# Patient Record
Sex: FEMALE | Race: WHITE | NOT HISPANIC OR LATINO
[De-identification: names, ages, dates, MRNs, and addresses within clinical notes are randomized per-mention and may not be internally consistent; named-entity substitution may affect disease eponyms.]

---

## 2018-02-12 ENCOUNTER — APPOINTMENT (OUTPATIENT)
Dept: UROLOGY | Facility: CLINIC | Age: 53
End: 2018-02-12
Payer: COMMERCIAL

## 2018-02-12 VITALS
WEIGHT: 200 LBS | BODY MASS INDEX: 33.32 KG/M2 | SYSTOLIC BLOOD PRESSURE: 156 MMHG | HEART RATE: 83 BPM | DIASTOLIC BLOOD PRESSURE: 83 MMHG | RESPIRATION RATE: 17 BRPM | HEIGHT: 65 IN

## 2018-02-12 DIAGNOSIS — R35.0 FREQUENCY OF MICTURITION: ICD-10-CM

## 2018-02-12 DIAGNOSIS — R82.90 UNSPECIFIED ABNORMAL FINDINGS IN URINE: ICD-10-CM

## 2018-02-12 PROCEDURE — 99204 OFFICE O/P NEW MOD 45 MIN: CPT

## 2018-02-14 LAB
APPEARANCE: CLEAR
BACTERIA UR CULT: NORMAL
BACTERIA: NEGATIVE
BILIRUBIN URINE: NEGATIVE
BLOOD URINE: ABNORMAL
COLOR: YELLOW
GLUCOSE QUALITATIVE U: NEGATIVE MG/DL
HYALINE CASTS: 1 /LPF
KETONES URINE: NEGATIVE
LEUKOCYTE ESTERASE URINE: NEGATIVE
MICROSCOPIC-UA: NORMAL
NITRITE URINE: NEGATIVE
PH URINE: 5.5
PROTEIN URINE: NEGATIVE MG/DL
RED BLOOD CELLS URINE: 7 /HPF
SPECIFIC GRAVITY URINE: 1.02
SQUAMOUS EPITHELIAL CELLS: 1 /HPF
UROBILINOGEN URINE: NEGATIVE MG/DL
WHITE BLOOD CELLS URINE: 1 /HPF

## 2018-02-19 ENCOUNTER — FORM ENCOUNTER (OUTPATIENT)
Age: 53
End: 2018-02-19

## 2018-02-20 ENCOUNTER — APPOINTMENT (OUTPATIENT)
Dept: CT IMAGING | Facility: IMAGING CENTER | Age: 53
End: 2018-02-20
Payer: COMMERCIAL

## 2018-02-20 ENCOUNTER — OUTPATIENT (OUTPATIENT)
Dept: OUTPATIENT SERVICES | Facility: HOSPITAL | Age: 53
LOS: 1 days | End: 2018-02-20
Payer: COMMERCIAL

## 2018-02-20 DIAGNOSIS — R31.29 OTHER MICROSCOPIC HEMATURIA: ICD-10-CM

## 2018-02-20 PROCEDURE — 82565 ASSAY OF CREATININE: CPT

## 2018-02-20 PROCEDURE — 74178 CT ABD&PLV WO CNTR FLWD CNTR: CPT | Mod: 26

## 2018-02-20 PROCEDURE — 74178 CT ABD&PLV WO CNTR FLWD CNTR: CPT

## 2018-02-21 ENCOUNTER — APPOINTMENT (OUTPATIENT)
Dept: UROLOGY | Facility: CLINIC | Age: 53
End: 2018-02-21
Payer: COMMERCIAL

## 2018-02-21 ENCOUNTER — OUTPATIENT (OUTPATIENT)
Dept: OUTPATIENT SERVICES | Facility: HOSPITAL | Age: 53
LOS: 1 days | End: 2018-02-21
Payer: COMMERCIAL

## 2018-02-21 VITALS
HEART RATE: 71 BPM | TEMPERATURE: 97.7 F | HEIGHT: 65 IN | WEIGHT: 210 LBS | DIASTOLIC BLOOD PRESSURE: 77 MMHG | RESPIRATION RATE: 16 BRPM | SYSTOLIC BLOOD PRESSURE: 118 MMHG | BODY MASS INDEX: 34.99 KG/M2

## 2018-02-21 DIAGNOSIS — R35.0 FREQUENCY OF MICTURITION: ICD-10-CM

## 2018-02-21 DIAGNOSIS — R31.29 OTHER MICROSCOPIC HEMATURIA: ICD-10-CM

## 2018-02-21 PROCEDURE — 52000 CYSTOURETHROSCOPY: CPT

## 2018-02-27 DIAGNOSIS — R31.29 OTHER MICROSCOPIC HEMATURIA: ICD-10-CM

## 2018-02-28 ENCOUNTER — APPOINTMENT (OUTPATIENT)
Dept: ULTRASOUND IMAGING | Facility: IMAGING CENTER | Age: 53
End: 2018-02-28

## 2018-03-01 ENCOUNTER — APPOINTMENT (OUTPATIENT)
Dept: ULTRASOUND IMAGING | Facility: CLINIC | Age: 53
End: 2018-03-01
Payer: COMMERCIAL

## 2018-03-01 ENCOUNTER — OUTPATIENT (OUTPATIENT)
Dept: OUTPATIENT SERVICES | Facility: HOSPITAL | Age: 53
LOS: 1 days | End: 2018-03-01
Payer: COMMERCIAL

## 2018-03-01 DIAGNOSIS — Z00.8 ENCOUNTER FOR OTHER GENERAL EXAMINATION: ICD-10-CM

## 2018-03-01 PROCEDURE — 76705 ECHO EXAM OF ABDOMEN: CPT

## 2018-03-01 PROCEDURE — 76705 ECHO EXAM OF ABDOMEN: CPT | Mod: 26

## 2018-03-15 ENCOUNTER — APPOINTMENT (OUTPATIENT)
Dept: NUCLEAR MEDICINE | Facility: HOSPITAL | Age: 53
End: 2018-03-15
Payer: COMMERCIAL

## 2018-03-15 ENCOUNTER — OUTPATIENT (OUTPATIENT)
Dept: OUTPATIENT SERVICES | Facility: HOSPITAL | Age: 53
LOS: 1 days | End: 2018-03-15
Payer: COMMERCIAL

## 2018-03-15 DIAGNOSIS — R10.83 COLIC: ICD-10-CM

## 2018-03-15 DIAGNOSIS — Z00.00 ENCOUNTER FOR GENERAL ADULT MEDICAL EXAMINATION WITHOUT ABNORMAL FINDINGS: ICD-10-CM

## 2018-03-15 PROCEDURE — 78227 HEPATOBIL SYST IMAGE W/DRUG: CPT | Mod: 26

## 2018-03-15 PROCEDURE — 78227 HEPATOBIL SYST IMAGE W/DRUG: CPT

## 2018-03-15 PROCEDURE — A9537: CPT

## 2018-04-09 ENCOUNTER — APPOINTMENT (OUTPATIENT)
Dept: UROGYNECOLOGY | Facility: CLINIC | Age: 53
End: 2018-04-09
Payer: COMMERCIAL

## 2018-04-09 VITALS
BODY MASS INDEX: 34.99 KG/M2 | HEIGHT: 65 IN | WEIGHT: 210 LBS | HEART RATE: 68 BPM | SYSTOLIC BLOOD PRESSURE: 128 MMHG | DIASTOLIC BLOOD PRESSURE: 80 MMHG

## 2018-04-09 DIAGNOSIS — Z80.6 FAMILY HISTORY OF LEUKEMIA: ICD-10-CM

## 2018-04-09 DIAGNOSIS — Z83.49 FAMILY HISTORY OF OTHER ENDOCRINE, NUTRITIONAL AND METABOLIC DISEASES: ICD-10-CM

## 2018-04-09 DIAGNOSIS — Z82.49 FAMILY HISTORY OF ISCHEMIC HEART DISEASE AND OTHER DISEASES OF THE CIRCULATORY SYSTEM: ICD-10-CM

## 2018-04-09 DIAGNOSIS — Z87.09 PERSONAL HISTORY OF OTHER DISEASES OF THE RESPIRATORY SYSTEM: ICD-10-CM

## 2018-04-09 DIAGNOSIS — Z87.01 PERSONAL HISTORY OF PNEUMONIA (RECURRENT): ICD-10-CM

## 2018-04-09 DIAGNOSIS — N81.6 RECTOCELE: ICD-10-CM

## 2018-04-09 PROCEDURE — 81003 URINALYSIS AUTO W/O SCOPE: CPT | Mod: QW

## 2018-04-09 PROCEDURE — 99204 OFFICE O/P NEW MOD 45 MIN: CPT | Mod: 25

## 2018-04-09 RX ORDER — LOSARTAN POTASSIUM 100 MG/1
TABLET, FILM COATED ORAL
Refills: 0 | Status: ACTIVE | COMMUNITY

## 2018-04-10 LAB
APPEARANCE: CLEAR
BACTERIA: NEGATIVE
BILIRUB UR QL STRIP: NORMAL
BILIRUBIN URINE: NEGATIVE
BLOOD URINE: NEGATIVE
CLARITY UR: CLEAR
COLLECTION METHOD: NORMAL
COLOR: YELLOW
GLUCOSE QUALITATIVE U: NEGATIVE MG/DL
GLUCOSE UR-MCNC: NORMAL
HCG UR QL: 0.2 EU/DL
HGB UR QL STRIP.AUTO: NORMAL
KETONES UR-MCNC: NORMAL
KETONES URINE: NEGATIVE
LEUKOCYTE ESTERASE UR QL STRIP: NORMAL
LEUKOCYTE ESTERASE URINE: NEGATIVE
MICROSCOPIC-UA: NORMAL
NITRITE UR QL STRIP: NORMAL
NITRITE URINE: NEGATIVE
PH UR STRIP: 7
PH URINE: 7
PROT UR STRIP-MCNC: NORMAL
PROTEIN URINE: NEGATIVE MG/DL
RED BLOOD CELLS URINE: 1 /HPF
SP GR UR STRIP: 1.01
SPECIFIC GRAVITY URINE: 1.01
SQUAMOUS EPITHELIAL CELLS: 0 /HPF
UROBILINOGEN URINE: NEGATIVE MG/DL
WHITE BLOOD CELLS URINE: 0 /HPF

## 2018-04-11 LAB — BACTERIA UR CULT: NORMAL

## 2018-04-19 ENCOUNTER — OUTPATIENT (OUTPATIENT)
Dept: OUTPATIENT SERVICES | Facility: HOSPITAL | Age: 53
LOS: 1 days | End: 2018-04-19
Payer: COMMERCIAL

## 2018-04-19 ENCOUNTER — APPOINTMENT (OUTPATIENT)
Dept: MAMMOGRAPHY | Facility: IMAGING CENTER | Age: 53
End: 2018-04-19
Payer: COMMERCIAL

## 2018-04-19 DIAGNOSIS — Z12.31 ENCOUNTER FOR SCREENING MAMMOGRAM FOR MALIGNANT NEOPLASM OF BREAST: ICD-10-CM

## 2018-04-19 PROCEDURE — 77067 SCR MAMMO BI INCL CAD: CPT | Mod: 26

## 2018-04-19 PROCEDURE — 77063 BREAST TOMOSYNTHESIS BI: CPT

## 2018-04-19 PROCEDURE — 77067 SCR MAMMO BI INCL CAD: CPT

## 2018-04-19 PROCEDURE — 77063 BREAST TOMOSYNTHESIS BI: CPT | Mod: 26

## 2018-04-24 ENCOUNTER — OUTPATIENT (OUTPATIENT)
Dept: OUTPATIENT SERVICES | Facility: HOSPITAL | Age: 53
LOS: 1 days | End: 2018-04-24
Payer: COMMERCIAL

## 2018-04-24 ENCOUNTER — APPOINTMENT (OUTPATIENT)
Dept: ULTRASOUND IMAGING | Facility: IMAGING CENTER | Age: 53
End: 2018-04-24
Payer: COMMERCIAL

## 2018-04-24 DIAGNOSIS — R92.2 INCONCLUSIVE MAMMOGRAM: ICD-10-CM

## 2018-04-24 PROCEDURE — 76642 ULTRASOUND BREAST LIMITED: CPT | Mod: 26,LT

## 2018-04-24 PROCEDURE — 76642 ULTRASOUND BREAST LIMITED: CPT

## 2018-04-26 ENCOUNTER — APPOINTMENT (OUTPATIENT)
Dept: UROGYNECOLOGY | Facility: CLINIC | Age: 53
End: 2018-04-26
Payer: COMMERCIAL

## 2018-04-26 ENCOUNTER — OUTPATIENT (OUTPATIENT)
Dept: OUTPATIENT SERVICES | Facility: HOSPITAL | Age: 53
LOS: 1 days | End: 2018-04-26
Payer: COMMERCIAL

## 2018-04-26 DIAGNOSIS — N39.3 STRESS INCONTINENCE (FEMALE) (MALE): ICD-10-CM

## 2018-04-26 DIAGNOSIS — N39.46 MIXED INCONTINENCE: ICD-10-CM

## 2018-04-26 DIAGNOSIS — Z01.818 ENCOUNTER FOR OTHER PREPROCEDURAL EXAMINATION: ICD-10-CM

## 2018-04-26 PROCEDURE — 51784 ANAL/URINARY MUSCLE STUDY: CPT

## 2018-04-26 PROCEDURE — 51797 INTRAABDOMINAL PRESSURE TEST: CPT | Mod: 26

## 2018-04-26 PROCEDURE — 51729 CYSTOMETROGRAM W/VP&UP: CPT | Mod: 26

## 2018-04-26 PROCEDURE — 51784 ANAL/URINARY MUSCLE STUDY: CPT | Mod: 26

## 2018-04-26 PROCEDURE — 51729 CYSTOMETROGRAM W/VP&UP: CPT

## 2018-04-26 PROCEDURE — 51797 INTRAABDOMINAL PRESSURE TEST: CPT

## 2018-04-27 DIAGNOSIS — N39.3 STRESS INCONTINENCE (FEMALE) (MALE): ICD-10-CM

## 2018-04-27 DIAGNOSIS — R33.9 RETENTION OF URINE, UNSPECIFIED: ICD-10-CM

## 2018-04-27 DIAGNOSIS — N39.46 MIXED INCONTINENCE: ICD-10-CM

## 2018-05-07 ENCOUNTER — APPOINTMENT (OUTPATIENT)
Dept: UROGYNECOLOGY | Facility: CLINIC | Age: 53
End: 2018-05-07
Payer: COMMERCIAL

## 2018-05-07 DIAGNOSIS — R33.9 RETENTION OF URINE, UNSPECIFIED: ICD-10-CM

## 2018-05-07 PROCEDURE — 99213 OFFICE O/P EST LOW 20 MIN: CPT

## 2018-07-25 PROBLEM — N39.3 STRESS INCONTINENCE: Status: ACTIVE | Noted: 2018-04-26

## 2018-08-06 ENCOUNTER — APPOINTMENT (OUTPATIENT)
Dept: UROGYNECOLOGY | Facility: CLINIC | Age: 53
End: 2018-08-06

## 2019-03-28 ENCOUNTER — APPOINTMENT (OUTPATIENT)
Dept: VASCULAR SURGERY | Facility: CLINIC | Age: 54
End: 2019-03-28
Payer: COMMERCIAL

## 2019-03-28 VITALS
SYSTOLIC BLOOD PRESSURE: 143 MMHG | BODY MASS INDEX: 36.65 KG/M2 | HEART RATE: 73 BPM | TEMPERATURE: 98 F | HEIGHT: 65 IN | DIASTOLIC BLOOD PRESSURE: 82 MMHG | WEIGHT: 220 LBS

## 2019-03-28 VITALS — HEART RATE: 69 BPM | DIASTOLIC BLOOD PRESSURE: 83 MMHG | SYSTOLIC BLOOD PRESSURE: 143 MMHG

## 2019-03-28 DIAGNOSIS — R60.0 LOCALIZED EDEMA: ICD-10-CM

## 2019-03-28 DIAGNOSIS — R25.2 CRAMP AND SPASM: ICD-10-CM

## 2019-03-28 DIAGNOSIS — Z82.49 FAMILY HISTORY OF ISCHEMIC HEART DISEASE AND OTHER DISEASES OF THE CIRCULATORY SYSTEM: ICD-10-CM

## 2019-03-28 PROCEDURE — 93970 EXTREMITY STUDY: CPT

## 2019-03-28 PROCEDURE — 99203 OFFICE O/P NEW LOW 30 MIN: CPT

## 2019-03-28 NOTE — PHYSICAL EXAM
[2+] : left 2+ [Ankle Swelling (On Exam)] : present [Varicose Veins Of Lower Extremities] : bilaterally [Ankle Swelling Bilaterally] : severe [] : bilaterally [Ankle Swelling On The Left] : moderate [No Rash or Lesion] : No rash or lesion [Alert] : alert [Oriented to Person] : oriented to person [Oriented to Place] : oriented to place [Oriented to Time] : oriented to time [Calm] : calm [JVD] : no jugular venous distention  [Skin Ulcer] : no ulcer [de-identified] : well appearing overweight female in NAD [de-identified] : NC / AT [de-identified] : non-labored respirations [de-identified] : reg rate [de-identified] : LE vein findings: \par Varicosities (spider, reticular, varicose) bilateral \par Edema bilateral \par Skin changes  dry skin, stasis dermatitis, hyperpigmentation in the gator area, lipodermatosclerosis, hyperkeratosis (skin thickening), eschar of the varix along the left medial malleolar area from previous bleed\par Ulcer none\par CEAP classification C4b\par Palpable DP pulses bilaterally\par \par  [de-identified] : pleasant and cooperative

## 2019-03-28 NOTE — DATA REVIEWED
[FreeTextEntry1] : Lower extremity venous duplex done today demonstrates bilateral venous insufficiency of the tributary veins. No evidence of DVT.\par

## 2019-03-28 NOTE — ASSESSMENT
[FreeTextEntry1] : Ms. SHELLY SMITH is a 53 year with persistent and worsening bilateral  lower extremity painful varicosities, recurrent bleeding varix (last year - RLE and recently LLE), CEAP classification C4b which is unresponsive to at least 3 months of compression stockings 20-30 mmHg, leg elevation, exercise and over the counter pain medication (Alleve).  Patient has complaints of worsening leg discomfort with swelling, fatigue, heaviness, achiness, cramping, restlessness, and painful varicosities.  The patient’s symptoms interfere with their ADL’s, such as walking, shopping and house work, and their ability to work. Patient has intact pulses in both legs without evidence of arterial insufficiency.  \par \par Treatment is indicated not for cosmetic reasons but for symptomatic venous reflux disease with symptoms such as bleeding varices which is refractory to conservative therapy. Venous duplex study demonstrates bilateral lower extremity venous insufficiency in the tributary veins. The need for definitive effective treatment is based on severe, interfering and worsening symptoms with evidence of venous insufficiency on venous ultrasound. \par \par Patient is a candidate for bilateral stab phlebectomy with ultrasound guided foam sclerotherapy.\par The risks and benefits of treatment versus continued conservative management were discussed with the patient.  Patient chooses phlebectomy with UGFS treatments. Treatment plan to be scheduled. \par \par

## 2019-03-28 NOTE — HISTORY OF PRESENT ILLNESS
[FreeTextEntry1] : Ms. SHELLY SMITH is a 53 year who presents for initial evaluation of bleeding left ankle varix a few days ago that every time she removes the bandaid rebleeds and she has to apply a compression bandage to control the bleeding. Patient has had bilateral leg pain, varicose veins and leg swelling for the past several years. She was worked up for venous insufficiency 6 years ago, however,  at the time her symptoms were not as severe. She had an episode of bleeding varix on her right leg last year but the bleeding stopped after direct pressure. \par Patient's leg discomfort is associated with leg swelling, fatigue, heaviness, achiness, restlessness, muscle cramping, itchiness, dry, flaky skin, and skin darkening at the ankles. \par Patient complains of painful varicose veins especially while working.\par Patient's symptoms have persisted despite conservative management with leg elevation, exercise, attempted weight loss, over-the-counter medications daily (Alleve), and compression stocking use for more than 3 months. \par Patient's symptoms are aggravated by weight bearing, prolonged standing, prolonged sitting, extended travel, exercise and previous pregnancy x 4.\par Patient's symptoms are alleviated by leg elevation and wearing compression stockings, however, she has been unable to tolerate them over the last year because her varicose veins have become more painful when wearing them.\par Patient's symptoms interfere with the patient's ADLs such as work, shopping and housekeeping.  \par \par Patient's risks factor for venous disease are obesity, pregnancy, family history of venous disease (mother - DVT, varicose veins s/p stripping, sister - varicose veins s/p ablations), history of varicose veins, and spider veins. \par Patient works as a caretaker for the elderly and stands for prolonged periods of time with the inability to take frequent breaks to elevate their legs. \par \par Patient has had no previous vein treatments. \par \par \par

## 2019-03-28 NOTE — VITALS
[Dull] : dull [Aching] : aching [Gnawing] : gnawing [Continuous] : continuous [FreeTextEntry1] : rest with leg elevation [FreeTextEntry2] : prolonged standing

## 2019-03-28 NOTE — REVIEW OF SYSTEMS
[Lower Ext Edema] : lower extremity edema [Limb Pain] : limb pain [Limb Swelling] : limb swelling [Fever] : no fever [Chills] : no chills [Eyesight Problems] : no eyesight problems [Loss Of Hearing] : no hearing loss [Chest Pain] : no chest pain [Palpitations] : no palpitations [Difficulty Walking] : no difficulty walking [Easy Bleeding] : no tendency for easy bleeding [Easy Bruising] : no tendency for easy bruising

## 2019-04-08 ENCOUNTER — OTHER (OUTPATIENT)
Age: 54
End: 2019-04-08

## 2019-04-08 RX ORDER — ALPRAZOLAM 0.5 MG/1
0.5 TABLET ORAL
Qty: 2 | Refills: 0 | Status: COMPLETED | COMMUNITY
Start: 2019-04-08 | End: 2019-04-09

## 2019-04-12 ENCOUNTER — APPOINTMENT (OUTPATIENT)
Dept: VASCULAR SURGERY | Facility: CLINIC | Age: 54
End: 2019-04-12
Payer: COMMERCIAL

## 2019-04-12 PROCEDURE — 36471 NJX SCLRSNT MLT INCMPTNT VN: CPT | Mod: LT

## 2019-04-12 PROCEDURE — 76942 ECHO GUIDE FOR BIOPSY: CPT | Mod: LT

## 2019-04-12 PROCEDURE — 37766 PHLEB VEINS - EXTREM 20+: CPT | Mod: LT

## 2019-04-12 NOTE — PROCEDURE
[FreeTextEntry1] : left stab phlebectomy with ultrasound guided foam sclerotherapy [FreeTextEntry3] : Procedural safety checklist and time out completed:\par Confirmed patient identification (Patient Name, , and/or medical record number including when possible affirmation by patient or parent/family/other.\par Confirmed procedure with the patient. Consent present, accurate and signed. \par Confirmed special equipment and supplies are present.\par Sterility confirmed. Position verified. \par Site/ side is marked and visible and confirmed. \par Procedure confirmed by consent. Accurate consent including side and site.\par Review of medical records noting correct procedure including site and side.\par MD/PA verifies presence and review of imaging studies and or written report of imaging studies.\par Specify equipment are available for the planned procedure.\par MD/PA has marked the patient's procedural site and side.\par Agreement on the procedure to be performed\par Time out completed.\par All of the above has been confirmed by the team.\par All patient-specific concerns have been addressed. \par \par Indication:  Left lower extremity varicose veins with inflammation, leg pain, leg swelling, and leg cramping.  \par \par Procedure: Stab phlebectomy left lower extremity\par \par  Ms. SHELLY SMITH is a 53 year old F with a history of symptomatic left lower extremity varicose veins. A trial of compression stockings, exercise, elevation, and pain medication was attempted without relief and definitive treatment with microphlebectomy was offered. \par \par I have discussed the risks of the procedure at length with the patient. The risks discussed were inclusive of but not limited to infection, irritation at the site of infiltration of local anesthesia, and also rare risk of deep venous thrombosis and pulmonary emboli. The patient agrees to proceed with the procedure. \par The patient was escorted into the procedure room, the varicose veins for treatment were marked out and the patient placed on the examination table. The entire limb was prepped and draped in sterile fashion and a  time out was called. \par \par Local anesthesia using 40 cc 1% lidocaine was infiltrated using a 25 gauge needle over the previously marked prominent varicose vein sites.\par Multiple small stab incisions, each less than 1 cm in length was made at the noted sites. With the help of a vein hook, the vein was fished out at each of these sites, rolled over a narrow-tipped mosquito clamp and removed. Hemostasis was secured with leg elevation and application of manual pressure. After assuring hemostasis, a sterile 4x4 was placed on the access sites and an ACE compression wrap was applied. Estimated blood loss: minimal. Patient tolerated procedure well. Patient was given post-procedure instructions and follow up appointment was scheduled. \par \par SIDE - LEFT	\par SITE - CALF / THIGH\par LOCATION - PROXIMAL / DISTAL / MEDIAL / LATERAL / ANTERIOR \par \par Total Stab Incisions 31\par \par Procedure: Left lower extremity ultrasound guided foam sclerotherapy. \par \par The patient has come for sclerotherapy of the left lower extremity. Location: left distal medial calf.\par I have discussed the risks of the procedure with the patient. Detailed discussion was held with the patient. Risks of itching, superficial thrombophlebitis, hyperpigmentation (darkening of the skin), allergic reactions, skin irritation due to extravasation of the sclerosant, air-embolism, and in rare cases deep vein thrombosis were all discussed with the patient. The patient agrees to the procedure. The patient was escorted into the procedure room, placed on the procedure table and a time out was called. \par \par 1.0 ml of 1.0% Sodium Tetra-decyl sulphate was mixed with 4 ml air. The vein was cannulated with a 27G butterfly needle. The foam solution injected and appropriate visualization of the foam going into the vein was achieved using direct ultrasound guidance. This was repeated at 2 different sites until the entire 3 ml of the foam solution was injected. Every injected area was immediately compressed with gauze. \par Repeat ultrasound of the treated vein was performed confirming successful treatment. After assuring hemostasis, a sterile 4x4 was placed on the access site and an ACE compression wrap was applied. \par Estimated Blood Loss: minimal\par Patient was given post-procedure instructions and follow up appointment with ultrasound was scheduled.\par Medication: STS 1% lot # [id number], exp 2020\par \par

## 2019-04-15 ENCOUNTER — APPOINTMENT (OUTPATIENT)
Dept: VASCULAR SURGERY | Facility: CLINIC | Age: 54
End: 2019-04-15
Payer: COMMERCIAL

## 2019-04-15 PROCEDURE — 93971 EXTREMITY STUDY: CPT

## 2019-04-26 ENCOUNTER — APPOINTMENT (OUTPATIENT)
Dept: VASCULAR SURGERY | Facility: CLINIC | Age: 54
End: 2019-04-26

## 2019-04-29 ENCOUNTER — APPOINTMENT (OUTPATIENT)
Dept: VASCULAR SURGERY | Facility: CLINIC | Age: 54
End: 2019-04-29

## 2019-05-03 ENCOUNTER — APPOINTMENT (OUTPATIENT)
Dept: VASCULAR SURGERY | Facility: CLINIC | Age: 54
End: 2019-05-03
Payer: COMMERCIAL

## 2019-05-03 PROCEDURE — 37766 PHLEB VEINS - EXTREM 20+: CPT | Mod: RT,79

## 2019-05-03 NOTE — PROCEDURE
[FreeTextEntry1] : right stab phlebectomy with ultrasound guided foam of the right tributary veins [FreeTextEntry3] : Procedural safety checklist and time out completed:\par Confirmed patient identification (Patient Name, , and/or medical record number including when possible affirmation by patient or parent/family/other.\par Confirmed procedure with the patient. Consent present, accurate and signed. \par Confirmed special equipment and supplies are present.\par Sterility confirmed. Position verified. \par Site/ side is marked and visible and confirmed. \par Procedure confirmed by consent. Accurate consent including side and site.\par Review of medical records noting correct procedure including site and side.\par MD/PA verifies presence and review of imaging studies and or written report of imaging studies.\par Specify equipment are available for the planned procedure.\par MD/PA has marked the patient's procedural site and side.\par Agreement on the procedure to be performed\par Time out completed.\par All of the above has been confirmed by the team.\par All patient-specific concerns have been addressed. \par \par Indication:  Right lower extremity varicose veins with inflammation, leg pain, leg swelling, and leg cramping.  \par \par Procedure: right stab phlebectomy with ultrasound guided foam of the right tributary veins\par \par  Ms. SHELLY SMITH is a 53 year old F with a history of symptomatic right lower extremity varicose veins. A trial of compression stockings, exercise, elevation, and pain medication was attempted without relief and definitive treatment with microphlebectomy was offered. \par \par I have discussed the risks of the procedure at length with the patient. The risks discussed were inclusive of but not limited to infection, irritation at the site of infiltration of local anesthesia, and also rare risk of deep venous thrombosis and pulmonary emboli. The patient agrees to proceed with the procedure. \par The patient was escorted into the procedure room, the varicose veins for treatment were marked out and the patient placed on the examination table. The entire limb was prepped and draped in sterile fashion and a  time out was called. \par \par Local anesthesia using 40 cc 1% lidocaine was infiltrated using a 25 gauge needle over the previously marked prominent varicose vein sites.\par Multiple small stab incisions, each less than 1 cm in length was made at the noted sites. With the help of a vein hook, the vein was fished out at each of these sites, rolled over a narrow-tipped mosquito clamp and removed. Hemostasis was secured with leg elevation and application of manual pressure. After assuring hemostasis, a sterile 4x4 was placed on the access sites and an ACE compression wrap was applied. Estimated blood loss: minimal. Patient tolerated procedure well. Patient was given post-procedure instructions and follow up appointment was scheduled. \par \par SIDE - RIGHT\par SITE - CALF / THIGH\par LOCATION - PROXIMAL / DISTAL / MEDIAL / LATERAL / ANTERIOR \par \par Total Stab Incisions 32\par \par The patient has come for sclerotherapy of the right lower extremity. Location: right medial ankle\par  \par I have discussed the risks of the procedure with the patient. Detailed discussion was held with the patient. Risks of itching, superficial thrombophlebitis, hyperpigmentation (darkening of the skin), allergic reactions, skin irritation due to extravasation of the sclerosant, air-embolism, and in rare cases deep vein thrombosis were all discussed with the patient. The patient agrees to the procedure. The patient was escorted into the procedure room, placed on the procedure table and a time out was called. \par \par 1.0 ml of 1.0% Sodium Tetra-decyl sulphate was mixed with 4 ml air. The vein was cannulated with a 27G butterfly needle. The foam solution injected and appropriate visualization of the foam going into the vein was achieved using direct ultrasound guidance. This was repeated at 2 different sites until the entire 4 ml of the foam solution was injected. Every injected area was immediately compressed with gauze. \par Repeat ultrasound of the treated vein was performed confirming successful treatment. After assuring hemostasis, a sterile 4x4 was placed on the access site and an ACE compression wrap was applied. \par Estimated Blood Loss: minimal\par Patient was given post-procedure instructions and follow up appointment with ultrasound was scheduled.\par Medication: STS 1% lot # [id number], exp 2020\par \par

## 2019-05-06 ENCOUNTER — APPOINTMENT (OUTPATIENT)
Dept: VASCULAR SURGERY | Facility: CLINIC | Age: 54
End: 2019-05-06
Payer: COMMERCIAL

## 2019-05-06 PROCEDURE — 93971 EXTREMITY STUDY: CPT

## 2019-05-24 ENCOUNTER — APPOINTMENT (OUTPATIENT)
Dept: VASCULAR SURGERY | Facility: CLINIC | Age: 54
End: 2019-05-24

## 2019-06-17 ENCOUNTER — APPOINTMENT (OUTPATIENT)
Dept: VASCULAR SURGERY | Facility: CLINIC | Age: 54
End: 2019-06-17

## 2020-01-09 ENCOUNTER — APPOINTMENT (OUTPATIENT)
Dept: VASCULAR SURGERY | Facility: CLINIC | Age: 55
End: 2020-01-09
Payer: COMMERCIAL

## 2020-01-09 VITALS
HEIGHT: 65 IN | SYSTOLIC BLOOD PRESSURE: 140 MMHG | TEMPERATURE: 97.8 F | WEIGHT: 220 LBS | BODY MASS INDEX: 36.65 KG/M2 | HEART RATE: 62 BPM | DIASTOLIC BLOOD PRESSURE: 80 MMHG

## 2020-01-09 PROCEDURE — 99212 OFFICE O/P EST SF 10 MIN: CPT

## 2020-01-15 NOTE — PHYSICAL EXAM
[No Rash or Lesion] : No rash or lesion [2+] : left 2+ [Skin Ulcer] : no ulcer [Alert] : alert [Oriented to Person] : oriented to person [Oriented to Place] : oriented to place [Oriented to Time] : oriented to time [Calm] : calm [FreeTextEntry1] : Legs warm and well perfused\par VV bilateral LE \par superficial varicosities below R knee [de-identified] : NAD [de-identified] : unlabored

## 2020-01-15 NOTE — ASSESSMENT
[FreeTextEntry1] : SHELLY SMITH is a 54 year female who presents today for follow up s/p bilateral stab phlebectomy and UGFS (L 4/2019, R 5/2019). Post-procedure ultrasounds were negative for DVT. She presents today for follow up evaluation. She is concerned about large veins present on both legs but especially present just below the right knee. She has a history of bleeding varices and is concerned about another incident. \par \par On exam, legs are warm and well perfused. Scattered varicosities present in bilateral lower extremities. Prominent and superficial varices noted below the right knee. Skin is intact.\par \par Recommended repeating VLE. Patient would like to return another day for the test. Will schedule VLE for a different day and discuss result and plan over the phone.

## 2020-01-15 NOTE — REVIEW OF SYSTEMS
[Chest Pain] : no chest pain [Leg Claudication] : no intermittent leg claudication [Abdominal Pain] : no abdominal pain [Shortness Of Breath] : no shortness of breath [Limb Swelling] : no limb swelling [Limb Pain] : no limb pain [Limb Weakness] : no limb weakness [Negative] : Constitutional [Difficulty Walking] : no difficulty walking [FreeTextEntry5] : bilateral LE VV

## 2020-01-15 NOTE — HISTORY OF PRESENT ILLNESS
[FreeTextEntry1] : SHELLY SMITH is a 54 year female who presents today for follow up s/p bilateral stab phlebectomy and UGFS (L 4/2019, R 5/2019). Post-procedure ultrasounds were negative for DVT. She presents today for follow up evaluation. She is concerned about large veins present on both legs but especially present just below the right knee. She has a history of bleeding varices and is concerned about another incident.

## 2021-04-22 ENCOUNTER — APPOINTMENT (OUTPATIENT)
Dept: VASCULAR SURGERY | Facility: CLINIC | Age: 56
End: 2021-04-22
Payer: COMMERCIAL

## 2021-04-22 VITALS
DIASTOLIC BLOOD PRESSURE: 84 MMHG | SYSTOLIC BLOOD PRESSURE: 156 MMHG | BODY MASS INDEX: 36.65 KG/M2 | WEIGHT: 220 LBS | TEMPERATURE: 97.6 F | HEIGHT: 65 IN | HEART RATE: 67 BPM

## 2021-04-22 VITALS — SYSTOLIC BLOOD PRESSURE: 147 MMHG | HEART RATE: 63 BPM | DIASTOLIC BLOOD PRESSURE: 82 MMHG

## 2021-04-22 DIAGNOSIS — I83.893 VARICOSE VEINS OF BILATERAL LOWER EXTREMITIES WITH OTHER COMPLICATIONS: ICD-10-CM

## 2021-04-22 PROCEDURE — 93970 EXTREMITY STUDY: CPT

## 2021-04-22 PROCEDURE — 99072 ADDL SUPL MATRL&STAF TM PHE: CPT

## 2021-04-24 PROBLEM — I83.893 VARICOSE VEINS OF LOWER EXTREMITIES WITH COMPLICATIONS, BILATERAL: Status: ACTIVE | Noted: 2019-03-28

## 2021-04-24 NOTE — ASSESSMENT
[FreeTextEntry1] : A/P: Patient with h/o venous insufficiency and s/p multiple vein procedures now with bleeding varix of the R anterior ankle crease. Recommend UGFS of the vein to prevent future bleeding episodes. Discussed procedure with the patient as well as r/b/a. Patient understands and wishes to proceed.

## 2021-04-24 NOTE — PHYSICAL EXAM
[2+] : left 2+ [No Rash or Lesion] : No rash or lesion [Skin Ulcer] : no ulcer [Alert] : alert [Oriented to Person] : oriented to person [Oriented to Place] : oriented to place [Oriented to Time] : oriented to time [Calm] : calm [de-identified] : NAD [FreeTextEntry1] : Legs warm and well perfused\par VV, edema, hyperpigmentation, dry skin, stasis dermatitis bilateral LE \par healed anterior R ankle varix [de-identified] : unlabored

## 2021-04-24 NOTE — HISTORY OF PRESENT ILLNESS
[FreeTextEntry1] : SHELLY SMITH is a 55 year old female who presents today for a bleeding varix. Reports that last week, she was shaving her legs and noticed bleeding from a varix on the R ankle crease. She applied compression and the bleeding stopped after several minutes. She reports that the area appears to be healing but she is concerned about another bleeding incident. She also c/o bilateral LE edema, dry skin, hyperpigmentation, and varicose veins. She has been wearing CS for several years. She is s/p bilateral stab phlebectomy and UGFS (L 4/2019, R 5/2019).\par \par VLE from today is negative for DVT bilaterally. Bilateral GSV and L SSV reflux in small caliber veins.

## 2021-04-24 NOTE — REVIEW OF SYSTEMS
[Leg Claudication] : no intermittent leg claudication [As Noted in HPI] : as noted in HPI [Negative] : Constitutional

## 2021-05-17 ENCOUNTER — APPOINTMENT (OUTPATIENT)
Dept: VASCULAR SURGERY | Facility: CLINIC | Age: 56
End: 2021-05-17

## 2021-05-17 DIAGNOSIS — Z01.812 ENCOUNTER FOR PREPROCEDURAL LABORATORY EXAMINATION: ICD-10-CM

## 2021-05-17 DIAGNOSIS — Z20.822 ENCOUNTER FOR PREPROCEDURAL LABORATORY EXAMINATION: ICD-10-CM

## 2021-05-18 LAB — SARS-COV-2 N GENE NPH QL NAA+PROBE: NOT DETECTED

## 2021-05-21 ENCOUNTER — APPOINTMENT (OUTPATIENT)
Dept: VASCULAR SURGERY | Facility: CLINIC | Age: 56
End: 2021-05-21
Payer: COMMERCIAL

## 2021-05-21 DIAGNOSIS — I83.899 VARICOSE VEINS OF UNSPECIFIED LOWER EXTREMITY WITH OTHER COMPLICATIONS: ICD-10-CM

## 2021-05-21 DIAGNOSIS — I83.892 VARICOSE VEINS OF LEFT LOWER EXTREMITY WITH OTHER COMPLICATIONS: ICD-10-CM

## 2021-05-21 PROCEDURE — 36471 NJX SCLRSNT MLT INCMPTNT VN: CPT | Mod: RT

## 2021-05-21 RX ORDER — SODIUM BICARBONATE 84 MG/ML
8.4 INJECTION, SOLUTION INTRAVENOUS
Qty: 5 | Refills: 0 | Status: COMPLETED | COMMUNITY
Start: 2019-04-08 | End: 2021-05-21

## 2021-05-21 RX ORDER — LIDOCAINE HYDROCHLORIDE 10 MG/ML
1 INJECTION, SOLUTION INFILTRATION; PERINEURAL
Qty: 50 | Refills: 0 | Status: COMPLETED | COMMUNITY
Start: 2019-04-08 | End: 2021-05-21

## 2021-05-21 RX ORDER — SODIUM CHLORIDE 9 G/ML
0.9 INJECTION, SOLUTION INTRAVENOUS
Qty: 500 | Refills: 0 | Status: COMPLETED | COMMUNITY
Start: 2019-04-08 | End: 2021-05-21

## 2021-05-21 NOTE — PROCEDURE
[FreeTextEntry1] : Ultrasound guided foam sclerotherapy of the right below the knee tributary veins [FreeTextEntry3] : Procedural safety checklist and time out completed:\par Confirmed patient identification (Patient Name, , and/or medical record number including when possible affirmation by patient or parent/family/other).\par Confirmed procedure with the patient. Consent present, accurate and signed. \par Confirmed special equipment and supplies are present.\par Sterility confirmed. Position verified. \par Site/ side is marked and visible and confirmed. \par Procedure confirmed by consent. Accurate consent including side and site.\par Review of medical records, including venous ultrasound, noting correct procedure including site and side.\par MD/PA verifies presence and review of imaging studies and or written report of imaging studies.\par Agreement on the procedure to be performed\par Time out completed.\par All of the above has been confirmed by the team.\par All patient-specific concerns have been addressed. \par \par Indication:  right lower extremity branch veins with leg ulcer, pain, leg swelling,  itching, burning and leg cramping. Venous insufficiency.\par \par Procedure: right lower extremity ultrasound guided foam sclerotherapy. \par \par Procedure Note:  Ms. SHELLY SMITH is a 55 year old F with right lower extremity below the knee tributary veins. \par \par The patient has come for sclerotherapy of the right lower extremity below the knee tributary veins and distal GSV.\par I have discussed the risks of the procedure with the patient. Detailed discussion was held with the patient. Risks of itching, superficial thrombophlebitis, hyperpigmentation (darkening of the skin), allergic reactions, skin irritation due to extravasation of the sclerosant, air-embolism, and in rare cases deep vein thrombosis were all discussed with the patient. The patient agrees to the procedure. The patient was escorted into the procedure room, placed on the procedure table and a time out was called. \par \par 1.0 ml of 1.0% Sodium Tetra-decyl sulphate was mixed with 4 ml air. The vein was cannulated with a 27G butterfly needle. The foam solution injected and appropriate visualization of the foam going into the vein was achieved using direct ultrasound guidance. This was repeated at 2 different sites until the entire  4 ml of the foam solution was injected. Every injected area was immediately compressed with gauze. \par Repeat ultrasound of the treated vein was performed confirming successful treatment. After assuring hemostasis, a sterile 4x4 was placed on the access site and an ACE compression wrap was applied. \par Estimated Blood Loss: minimal\par Patient was given post-procedure instructions and follow up appointment with ultrasound was scheduled.\par Medication: STS 1% lot #  [id number]  , exp 2022\par \par

## 2021-05-24 ENCOUNTER — APPOINTMENT (OUTPATIENT)
Dept: VASCULAR SURGERY | Facility: CLINIC | Age: 56
End: 2021-05-24
Payer: COMMERCIAL

## 2021-05-24 PROCEDURE — 93971 EXTREMITY STUDY: CPT

## 2022-01-08 ENCOUNTER — EMERGENCY (EMERGENCY)
Facility: HOSPITAL | Age: 57
LOS: 1 days | Discharge: HOME | End: 2022-01-08
Attending: CLINIC/CENTER
Payer: COMMERCIAL

## 2022-01-08 VITALS
DIASTOLIC BLOOD PRESSURE: 88 MMHG | TEMPERATURE: 98 F | SYSTOLIC BLOOD PRESSURE: 147 MMHG | HEIGHT: 65 IN | RESPIRATION RATE: 18 BRPM | WEIGHT: 216.93 LBS | OXYGEN SATURATION: 98 % | HEART RATE: 109 BPM

## 2022-01-08 PROCEDURE — 99284 EMERGENCY DEPT VISIT MOD MDM: CPT

## 2022-01-08 NOTE — ED ADULT TRIAGE NOTE - AS HEIGHT TYPE
Arlyn RAYA Hilda 
stated

## 2022-01-08 NOTE — ED ADULT TRIAGE NOTE - WEIGHT IN KG
Will hold off on PT. Want her knee and leg resting for now to let swelling and bruising subside. I want her to get it as straight as possible and leave it there.    98.4

## 2022-01-08 NOTE — ED ADULT TRIAGE NOTE - CHIEF COMPLAINT QUOTE
trip and fall about 5:30 pm today; tripped on something and fell onto face; has nasal swelling; no loc; no blood thinners

## 2022-01-09 VITALS
HEART RATE: 98 BPM | OXYGEN SATURATION: 99 % | SYSTOLIC BLOOD PRESSURE: 150 MMHG | RESPIRATION RATE: 20 BRPM | DIASTOLIC BLOOD PRESSURE: 77 MMHG

## 2022-01-09 PROCEDURE — 70450 CT HEAD/BRAIN W/O DYE: CPT | Mod: MA

## 2022-01-09 PROCEDURE — 70450 CT HEAD/BRAIN W/O DYE: CPT | Mod: 26,MA

## 2022-01-09 PROCEDURE — 70486 CT MAXILLOFACIAL W/O DYE: CPT | Mod: MA

## 2022-01-09 PROCEDURE — 76377 3D RENDER W/INTRP POSTPROCES: CPT | Mod: 26

## 2022-01-09 PROCEDURE — 70486 CT MAXILLOFACIAL W/O DYE: CPT | Mod: 26,MA

## 2022-01-09 PROCEDURE — 76377 3D RENDER W/INTRP POSTPROCES: CPT

## 2022-01-09 PROCEDURE — 99284 EMERGENCY DEPT VISIT MOD MDM: CPT | Mod: 25

## 2022-01-09 RX ORDER — ACETAMINOPHEN 500 MG
975 TABLET ORAL ONCE
Refills: 0 | Status: COMPLETED | OUTPATIENT
Start: 2022-01-09 | End: 2022-01-09

## 2022-01-09 RX ADMIN — Medication 975 MILLIGRAM(S): at 00:27

## 2022-01-09 NOTE — ED PROVIDER NOTE - PHYSICAL EXAMINATION
Gen: A&Ox4   HEENT: swelling over bridge of nose w/ mild ecchymosis. Facial bones stable throughout. No mal-occlusion. EOMI. Mucous membranes moist, no scleral icterus. No midline C spine ttp.  CV: RRR. No significant lower extremity edema.   Resp: Respirations unlabored. CTAB, no rales, no wheezes.  GI: Abdomen non tender to palpation, soft and non-distended.   Skin/MSK: No open wounds. No ecchymosis appreciated.  Neuro: Following commands. CN2-12 grossly intact. Motor strength +5/5 throughout upper and lower extremities. Sensation intact throughout upper and lower extremities. Finger to nose smooth and coordinated. Negative rhomberg. No pronator drift. Gait stable and coordinated.  Psych: Appropriate mood, cooperative

## 2022-01-09 NOTE — ED PROVIDER NOTE - CLINICAL SUMMARY MEDICAL DECISION MAKING FREE TEXT BOX
57 yo F hx of HTN, presenting to ED following mechanical trip and fall from standing at 530 PM this evening. Fell onto knees then face with glasses on face. No LOC. States she tripped over a stool. Associated HA. No N/V, focal neuro deficits, blurred or double vision. Denies further injuries beyond face. Had brief episode of epistaxis. No difficulty breathing. No AC. Exam as above. Neuro intact. Consider facial bone fracture. Less likely ICH/SAH. CT head and max face. Tylenol for HA. Will reassess.

## 2022-01-09 NOTE — ED PROVIDER NOTE - OBJECTIVE STATEMENT
57 yo F hx of HTN, presenting to ED following mechanical trip and fall from standing at 530 PM this evening. Fell onto knees then face with glasses on face. No LOC. States she tripped over a stool. Associated HA. No N/V, focal neuro deficits, blurred or double vision. Denies further injuries beyond face. Had brief episode of epistaxis. No difficulty breathing. No AC.

## 2022-01-09 NOTE — ED PROVIDER NOTE - NSFOLLOWUPINSTRUCTIONS_ED_ALL_ED_FT
Thank you for visiting our Emergency Department, it has been a pleasure taking part in your healthcare. Please follow up with your primary doctor within x48 hours.    Use ice and cold pack on your face and nose.     Return precautions to the Emergency Department include but are not limited to: unrelenting nausea, vomiting, fever, chills, chest pain, shortness of breath, dizziness, abdominal pain, worsening pain, syncope, blood in urine or stool, headache that doesn't resolve, numbness or tingling, loss of sensation, loss of motor function, or any other concerning symptoms.

## 2022-01-09 NOTE — ED PROVIDER NOTE - PATIENT PORTAL LINK FT
You can access the FollowMyHealth Patient Portal offered by [facility] by registering at the following website: [email or web address]. By joining [facility]’s FollowMyHealth portal, you will also be able to view your health information using other applications (apps) compatible with our system.

## 2022-01-09 NOTE — ED PROVIDER NOTE - ATTENDING CONTRIBUTION TO CARE
Agree with above except noted:     mechanical fall w.o syncope. mild pain in the nasal area, no septal hematoma, small frontal hematoma. no AC meds. will get ct max facial and reassess.

## 2022-01-09 NOTE — ED ADULT NURSE NOTE - OBJECTIVE STATEMENT
Patient is a 56 yr old female with a PMH of HTN who presents to the ED from home s/p fall. Per patient she had a trip and fall, landing on her face. Patient denies LOC or blood thinners and now has some nasal bridge swelling. Upon assessment, patient is AOx4, afebrile, breathing spontaneously on RA, abdomen soft/non tender, +pulses, facial bruising/swelling around nose/eyes, and denies n/v/d/f/chills/SOB/CP/headache/cough. Provider assessed at bedside, all safety precautions maintained and all needs met at this time

## 2024-06-04 ENCOUNTER — APPOINTMENT (OUTPATIENT)
Dept: OBGYN | Facility: CLINIC | Age: 59
End: 2024-06-04
Payer: COMMERCIAL

## 2024-06-04 PROCEDURE — 99386 PREV VISIT NEW AGE 40-64: CPT

## 2024-06-04 PROCEDURE — 96127 BRIEF EMOTIONAL/BEHAV ASSMT: CPT

## 2024-06-04 PROCEDURE — 99459 PELVIC EXAMINATION: CPT
